# Patient Record
Sex: FEMALE | Race: WHITE | NOT HISPANIC OR LATINO | URBAN - METROPOLITAN AREA
[De-identification: names, ages, dates, MRNs, and addresses within clinical notes are randomized per-mention and may not be internally consistent; named-entity substitution may affect disease eponyms.]

---

## 2018-09-05 ENCOUNTER — EMERGENCY (EMERGENCY)
Facility: HOSPITAL | Age: 68
LOS: 1 days | Discharge: HOME | End: 2018-09-05
Attending: EMERGENCY MEDICINE
Payer: MEDICARE

## 2018-09-05 VITALS
DIASTOLIC BLOOD PRESSURE: 76 MMHG | OXYGEN SATURATION: 99 % | HEART RATE: 68 BPM | HEIGHT: 61 IN | TEMPERATURE: 98 F | SYSTOLIC BLOOD PRESSURE: 135 MMHG | RESPIRATION RATE: 17 BRPM | WEIGHT: 115.08 LBS

## 2018-09-05 VITALS
OXYGEN SATURATION: 99 % | RESPIRATION RATE: 16 BRPM | HEART RATE: 72 BPM | DIASTOLIC BLOOD PRESSURE: 64 MMHG | SYSTOLIC BLOOD PRESSURE: 117 MMHG

## 2018-09-05 LAB
ALBUMIN SERPL ELPH-MCNC: 4.3 G/DL — SIGNIFICANT CHANGE UP (ref 3.3–5)
ALP SERPL-CCNC: 87 U/L — SIGNIFICANT CHANGE UP (ref 40–120)
ALT FLD-CCNC: 26 U/L — SIGNIFICANT CHANGE UP (ref 10–45)
ANION GAP SERPL CALC-SCNC: 11 MMOL/L — SIGNIFICANT CHANGE UP (ref 5–17)
APPEARANCE UR: CLEAR — SIGNIFICANT CHANGE UP
AST SERPL-CCNC: 30 U/L — SIGNIFICANT CHANGE UP (ref 10–40)
BASOPHILS # BLD AUTO: 0 K/UL — SIGNIFICANT CHANGE UP (ref 0–0.2)
BASOPHILS NFR BLD AUTO: 0.2 % — SIGNIFICANT CHANGE UP (ref 0–2)
BILIRUB SERPL-MCNC: 0.4 MG/DL — SIGNIFICANT CHANGE UP (ref 0.2–1.2)
BILIRUB UR-MCNC: NEGATIVE — SIGNIFICANT CHANGE UP
BUN SERPL-MCNC: 17 MG/DL — SIGNIFICANT CHANGE UP (ref 7–23)
CALCIUM SERPL-MCNC: 10.2 MG/DL — SIGNIFICANT CHANGE UP (ref 8.4–10.5)
CHLORIDE SERPL-SCNC: 97 MMOL/L — SIGNIFICANT CHANGE UP (ref 96–108)
CO2 SERPL-SCNC: 28 MMOL/L — SIGNIFICANT CHANGE UP (ref 22–31)
COLOR SPEC: YELLOW — SIGNIFICANT CHANGE UP
CREAT SERPL-MCNC: 1.01 MG/DL — SIGNIFICANT CHANGE UP (ref 0.5–1.3)
DIFF PNL FLD: NEGATIVE — SIGNIFICANT CHANGE UP
EOSINOPHIL # BLD AUTO: 0 K/UL — SIGNIFICANT CHANGE UP (ref 0–0.5)
EOSINOPHIL NFR BLD AUTO: 0.5 % — SIGNIFICANT CHANGE UP (ref 0–6)
EPI CELLS # UR: SIGNIFICANT CHANGE UP /HPF
GAS PNL BLDV: SIGNIFICANT CHANGE UP
GLUCOSE SERPL-MCNC: 134 MG/DL — HIGH (ref 70–99)
GLUCOSE UR QL: NEGATIVE — SIGNIFICANT CHANGE UP
HCT VFR BLD CALC: 42.4 % — SIGNIFICANT CHANGE UP (ref 34.5–45)
HGB BLD-MCNC: 14 G/DL — SIGNIFICANT CHANGE UP (ref 11.5–15.5)
KETONES UR-MCNC: ABNORMAL
LEUKOCYTE ESTERASE UR-ACNC: ABNORMAL
LIDOCAIN IGE QN: 36 U/L — SIGNIFICANT CHANGE UP (ref 7–60)
LYMPHOCYTES # BLD AUTO: 0.6 K/UL — LOW (ref 1–3.3)
LYMPHOCYTES # BLD AUTO: 5.9 % — LOW (ref 13–44)
MCHC RBC-ENTMCNC: 31 PG — SIGNIFICANT CHANGE UP (ref 27–34)
MCHC RBC-ENTMCNC: 33 GM/DL — SIGNIFICANT CHANGE UP (ref 32–36)
MCV RBC AUTO: 93.8 FL — SIGNIFICANT CHANGE UP (ref 80–100)
MONOCYTES # BLD AUTO: 0.2 K/UL — SIGNIFICANT CHANGE UP (ref 0–0.9)
MONOCYTES NFR BLD AUTO: 1.6 % — LOW (ref 2–14)
NEUTROPHILS # BLD AUTO: 8.6 K/UL — HIGH (ref 1.8–7.4)
NEUTROPHILS NFR BLD AUTO: 91.7 % — HIGH (ref 43–77)
NITRITE UR-MCNC: NEGATIVE — SIGNIFICANT CHANGE UP
PH UR: 6.5 — SIGNIFICANT CHANGE UP (ref 5–8)
PLATELET # BLD AUTO: 200 K/UL — SIGNIFICANT CHANGE UP (ref 150–400)
POTASSIUM SERPL-MCNC: 3.7 MMOL/L — SIGNIFICANT CHANGE UP (ref 3.5–5.3)
POTASSIUM SERPL-SCNC: 3.7 MMOL/L — SIGNIFICANT CHANGE UP (ref 3.5–5.3)
PROT SERPL-MCNC: 6.7 G/DL — SIGNIFICANT CHANGE UP (ref 6–8.3)
PROT UR-MCNC: SIGNIFICANT CHANGE UP
RBC # BLD: 4.52 M/UL — SIGNIFICANT CHANGE UP (ref 3.8–5.2)
RBC # FLD: 12.9 % — SIGNIFICANT CHANGE UP (ref 10.3–14.5)
RBC CASTS # UR COMP ASSIST: SIGNIFICANT CHANGE UP /HPF (ref 0–2)
SODIUM SERPL-SCNC: 136 MMOL/L — SIGNIFICANT CHANGE UP (ref 135–145)
SP GR SPEC: 1.02 — SIGNIFICANT CHANGE UP (ref 1.01–1.02)
TROPONIN T, HIGH SENSITIVITY RESULT: 7 NG/L — SIGNIFICANT CHANGE UP (ref 0–51)
TROPONIN T, HIGH SENSITIVITY RESULT: 7 NG/L — SIGNIFICANT CHANGE UP (ref 0–51)
UROBILINOGEN FLD QL: NEGATIVE — SIGNIFICANT CHANGE UP
WBC # BLD: 9.3 K/UL — SIGNIFICANT CHANGE UP (ref 3.8–10.5)
WBC # FLD AUTO: 9.3 K/UL — SIGNIFICANT CHANGE UP (ref 3.8–10.5)
WBC UR QL: SIGNIFICANT CHANGE UP /HPF (ref 0–5)

## 2018-09-05 PROCEDURE — 83690 ASSAY OF LIPASE: CPT

## 2018-09-05 PROCEDURE — 82803 BLOOD GASES ANY COMBINATION: CPT

## 2018-09-05 PROCEDURE — 93005 ELECTROCARDIOGRAM TRACING: CPT

## 2018-09-05 PROCEDURE — 99283 EMERGENCY DEPT VISIT LOW MDM: CPT

## 2018-09-05 PROCEDURE — 99284 EMERGENCY DEPT VISIT MOD MDM: CPT | Mod: 25

## 2018-09-05 PROCEDURE — 82947 ASSAY GLUCOSE BLOOD QUANT: CPT

## 2018-09-05 PROCEDURE — 74019 RADEX ABDOMEN 2 VIEWS: CPT | Mod: 26

## 2018-09-05 PROCEDURE — 82435 ASSAY OF BLOOD CHLORIDE: CPT

## 2018-09-05 PROCEDURE — 85014 HEMATOCRIT: CPT

## 2018-09-05 PROCEDURE — 82330 ASSAY OF CALCIUM: CPT

## 2018-09-05 PROCEDURE — 84295 ASSAY OF SERUM SODIUM: CPT

## 2018-09-05 PROCEDURE — 81001 URINALYSIS AUTO W/SCOPE: CPT

## 2018-09-05 PROCEDURE — 83605 ASSAY OF LACTIC ACID: CPT

## 2018-09-05 PROCEDURE — 80053 COMPREHEN METABOLIC PANEL: CPT

## 2018-09-05 PROCEDURE — 74019 RADEX ABDOMEN 2 VIEWS: CPT

## 2018-09-05 PROCEDURE — 85027 COMPLETE CBC AUTOMATED: CPT

## 2018-09-05 PROCEDURE — 84484 ASSAY OF TROPONIN QUANT: CPT

## 2018-09-05 PROCEDURE — 93010 ELECTROCARDIOGRAM REPORT: CPT

## 2018-09-05 PROCEDURE — 84132 ASSAY OF SERUM POTASSIUM: CPT

## 2018-09-05 RX ORDER — SODIUM CHLORIDE 9 MG/ML
1000 INJECTION INTRAMUSCULAR; INTRAVENOUS; SUBCUTANEOUS ONCE
Qty: 0 | Refills: 0 | Status: COMPLETED | OUTPATIENT
Start: 2018-09-05 | End: 2018-09-05

## 2018-09-05 RX ORDER — ONDANSETRON 8 MG/1
4 TABLET, FILM COATED ORAL ONCE
Qty: 0 | Refills: 0 | Status: COMPLETED | OUTPATIENT
Start: 2018-09-05 | End: 2018-09-05

## 2018-09-05 RX ADMIN — SODIUM CHLORIDE 1000 MILLILITER(S): 9 INJECTION INTRAMUSCULAR; INTRAVENOUS; SUBCUTANEOUS at 20:52

## 2018-09-05 NOTE — ED ADULT NURSE NOTE - NSIMPLEMENTINTERV_GEN_ALL_ED
Implemented All Universal Safety Interventions:  [place] to call system. Call bell, personal items and telephone within reach. Instruct patient to call for assistance. Room bathroom lighting operational. Non-slip footwear when patient is off stretcher. Physically safe environment: no spills, clutter or unnecessary equipment. Stretcher in lowest position, wheels locked, appropriate side rails in place.

## 2018-09-05 NOTE — ED PROVIDER NOTE - ATTENDING CONTRIBUTION TO CARE
Attending MD Gallegos:   I personally have seen and examined this patient.  Physician assistant note reviewed and agree on plan of care and except where noted.  See below for details.     67F with extensive PMH/PSH including GERD, MV repair, cervical cancer s/p hysterectomy, umbilical hernia repair, spinal fusion for scoliosis T4 to sacrum presents to the ED with abdominal pain and nausea.  Reports that developed periumbilical pain while driving, worsened at around 3pm after po intake.  Reports deep stabbing pain for three hours, no previous episode, decided to come in.  Reports now feeling improved, pain and nausea almost resolved.  Reports has frequent BMs at baseline and had about 4-5 with this pain, well formed, but increased in frequency.  Denies blood.  Reports nausea, no emesis.  Denies chest pain, shortness of breath, palpitations. Denies dysuria, hematuria, change in urinary habits including frequency, urgency. Denies fevers, reports chills while having severe pain, then resolved.  Denies syncope.  On exam, NAD, head NCAT, FROM at neck, no tenderness to palpation or stepoffs along length of spine, well healed surgical scar along spine, lungs CTAB with good inspiratory effort, +S1S2, +murmur, no r/g, well healed surgical scar in R upper chest, abdomen soft with +BS, minimal if any tenderness about 2cm cephalad and L lateral to umbilicus, ND, no CVAT, well healed surgical scars, no rebound, no guarding, moving all extremities with 5/5 strength bilateral upper and lower extremities, good and equal  strength bilaterally ;A/P: 67F with resolving nausea and abdominal pain, Ddx includes GI in etiology, doubt obstruction but given previous surgeries will obtain XR abdomen, doubt urine given denial of symptoms but will send UA, ddx includes cardiac in etiology, will obtain EKG and enzymes, will obtain labs.  Discussion with patient that given almost complete resolution of symptoms and benign abdominal exam, will obtain XR abdomen initially and look at labs, if non actionable, will defer further imaging at this time.  Patient amenable.

## 2018-09-05 NOTE — ED PROVIDER NOTE - PROGRESS NOTE DETAILS
Attending MD Gallegos: Received call from Dr. Solorzano [phone].  Updated him on case.  Awaiting XRs. Attending MD Gallegos: Patient re-evaluated and feeling improved.  No acute issues at  this time.  Lab and radiology tests reviewed with patient and family and Dr. Solorzano.  Discussed mod LE with Dr. Solorzano, will repeat UA in office tomorow.  Patient stable for discharge. Follow up instructions given, importance of follow up emphasized, return to ED parameters reviewed and patient verbalized understanding.  All questions answered, all concerns addressed. Attending MD Gallegos: Patient re-evaluated and feeling improved.  No acute issues at  this time.  Lab and radiology tests reviewed with patient and family and Dr. Solorzano.  Discussed mod LE with Dr. Solorzano, will repeat UA in office tomorrow.  Does not wish to stay for repeat troponin, had wanted to leave before, agreeable to draw now and call to [phone] with results.  Patient stable for discharge. Follow up instructions given, importance of follow up emphasized, return to ED parameters reviewed and patient verbalized understanding.  All questions answered, all concerns addressed. Attending MD Gallegos: Called patient, gave result of troponin, verbalized understanding.

## 2018-09-05 NOTE — ED ADULT NURSE NOTE - OBJECTIVE STATEMENT
Patient presented to ED ambulatory w/ c/o left side of umbilicus abdominal pain for 3/4 hours. Patient is only there and came very sharp initially, presently is 2/10 , the pain came accompanied w/ Nausea, no vomiting. No tenderness w/ palpation, no rebound tenderness noted. Patient stating pain is much less now.

## 2018-09-05 NOTE — ED PROVIDER NOTE - OBJECTIVE STATEMENT
66yo F with pmhx of GERD past surg history of mitral valve repair, spinal fusion from t4-sacrum, hysterectomy 2004 due to cervical cancer. umbilical hernia repair 2013 presents to ER c/o of non-radiating sharp periumbilical pain that started @1pm today while she was driving reports at that time pain was mild, went home and at lunch and pain increased significantly associated with nausea, reports had about 5 non-bloody BM, however usually has ~3-5BM daily.  Reports @630pm pain eased up greatly.  Denies fevers, chills, diarrhea, dysuria, vomiting, recent travel, chest pain, SOB,  recent ABX use, recent travel, back pain. bloody stool

## 2022-05-17 ENCOUNTER — APPOINTMENT (OUTPATIENT)
Dept: ORTHOPEDIC SURGERY | Facility: CLINIC | Age: 72
End: 2022-05-17

## 2022-06-10 ENCOUNTER — APPOINTMENT (OUTPATIENT)
Dept: ORTHOPEDIC SURGERY | Facility: CLINIC | Age: 72
End: 2022-06-10
Payer: MEDICARE

## 2022-06-10 VITALS — BODY MASS INDEX: 21.53 KG/M2 | HEIGHT: 62 IN | WEIGHT: 117 LBS

## 2022-06-10 DIAGNOSIS — Z78.9 OTHER SPECIFIED HEALTH STATUS: ICD-10-CM

## 2022-06-10 PROCEDURE — 99213 OFFICE O/P EST LOW 20 MIN: CPT

## 2022-09-30 ENCOUNTER — APPOINTMENT (OUTPATIENT)
Dept: ORTHOPEDIC SURGERY | Facility: CLINIC | Age: 72
End: 2022-09-30

## 2022-09-30 VITALS — HEIGHT: 62 IN | WEIGHT: 117 LBS | BODY MASS INDEX: 21.53 KG/M2

## 2022-09-30 DIAGNOSIS — M79.671 PAIN IN RIGHT FOOT: ICD-10-CM

## 2022-09-30 DIAGNOSIS — M79.672 PAIN IN RIGHT FOOT: ICD-10-CM

## 2022-09-30 PROCEDURE — 99213 OFFICE O/P EST LOW 20 MIN: CPT

## 2022-09-30 NOTE — DATA REVIEWED
[MRI] : MRI [Ankle] : ankle [I reviewed the films/CD] : I reviewed the films/CD [FreeTextEntry1] : [facility] 9/15/20:  MRI R ankle:  Small plantar fibroma along the medial band of the plantar fascia, in the region of the soft tissue markers. Mild degenerative thickening of the plantar fascia at the calcaneal attachment, without tear or perifascial edema. [FreeTextEntry2] : [facility] 9/15/20:  MRI L ankle: IMPRESSION:\par 1. Mild degenerative thickening of the plantar fascia at the calcaneal attachment, associated with a \par small plantar calcaneal spur. No plantar fascial tear is seen.\par 2. Evidence of remote anterior talofibular ligament injury.

## 2022-09-30 NOTE — HISTORY OF PRESENT ILLNESS
[Left Leg] : left leg [Right Leg] : right leg [Gradual] : gradual [7] : 7 [6] : 6 [Dull/Aching] : dull/aching [Localized] : localized [Intermittent] : intermittent [Physical therapy] : physical therapy [Standing] : standing [Walking] : walking [de-identified] :  9/4/20: bilateral heel pain ongoing x 3 weeks. feels that the heels are worse with standing and without shoes. has lump to the plantar aspect of the right foot for about a year growing in size and getting painful. saw podiatry 2 weeks ago where she was given topical diclofenac/verapamil without relief. no n/t. \par 10/6/20 f/u bilat heel, am pain \par 11/20/20: fu bilateral heels. much better with PT. Transitioned to HEP. No AM pain. 80% improved \par 6/10/22: here for b/l heel pain. Started PT and night splints with some relieve.\par 9/30/22 f/u b/l heel pain ongoing since March. Increased since end of summer. She cannot tolerate prolonged standing, she has increased functional limitations or remain active. She saw her DPM, xrays were done  9/23/22 -> plantar spurs. PT/HEP, stretching on reformer, using ice without relief. She has resumed night splint. Shoe inserts without relief.  R is worse. [] : no [FreeTextEntry1] : feet [FreeTextEntry5] : no injury  [de-identified] : Dr Salazar  [de-identified] : physical therapy

## 2022-09-30 NOTE — DISCUSSION/SUMMARY
[de-identified] : She continues to have pain and functional limitations.\par PRP discussed. \par PT 2-3 x a week, HEP. Night splint ice, hep.\par Discussed precaution with csi.\par She is considering PRP as reviewed.\par Updated MRI B/L ankles indicated to evaluate new tear plantar fascia/pathology

## 2022-10-02 ENCOUNTER — FORM ENCOUNTER (OUTPATIENT)
Age: 72
End: 2022-10-02

## 2022-10-03 ENCOUNTER — APPOINTMENT (OUTPATIENT)
Dept: MRI IMAGING | Facility: CLINIC | Age: 72
End: 2022-10-03

## 2022-10-03 PROCEDURE — 73721 MRI JNT OF LWR EXTRE W/O DYE: CPT | Mod: LT,MH

## 2022-10-03 PROCEDURE — 73721 MRI JNT OF LWR EXTRE W/O DYE: CPT | Mod: RT,MH

## 2022-10-14 ENCOUNTER — APPOINTMENT (OUTPATIENT)
Dept: ORTHOPEDIC SURGERY | Facility: CLINIC | Age: 72
End: 2022-10-14

## 2022-10-14 VITALS — BODY MASS INDEX: 21.53 KG/M2 | WEIGHT: 117 LBS | HEIGHT: 62 IN

## 2022-10-14 PROCEDURE — 99214 OFFICE O/P EST MOD 30 MIN: CPT

## 2022-10-14 NOTE — DATA REVIEWED
[Right] : of the right [Ankle] : ankle [I independently reviewed and interpreted images and report] : I independently reviewed and interpreted images and report [MRI] : MRI [Report was reviewed and noted in the chart] : The report was reviewed and noted in the chart [I reviewed the films/CD and agree] : I reviewed the films/CD and agree [FreeTextEntry1] : 10/3/22: 1. Plantar fascial thickening, degeneration at origin with 2 mm spur. This is stable from the prior study. No \par fibroma.\par 2. Achilles tendinopathy with peritendinous edema stable from the prior study.\par 3. Posterior tibial tendinopathy with tenosynovitis, decreased tenosynovitis from the prior study.\par 4. Scarring of tarsal sinus ligament and fat with 12 mm ganglion at dorsal margin of the sinus projecting over \par the lateral talonavicular joint. 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 [FreeTextEntry2] : 10/3/22 1. 11 x 9 mm plantar fibroma 5.1 cm distal to the origin, decreased in size from the prior study.\par 2. Posterior tibial tendinopathy with tenosynovitis, increased from the prior study. 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 Remainder of the exam is not significantly changed. [FreeTextEntry3] : Impression:  1. Plantar fascial thickening, degeneration at origin with 2 mm spur. This is stable from the prior study. No  fibroma. 2. Achilles tendinopathy with peritendinous edema stable from the prior study. 3. Posterior tibial tendinopathy with tenosynovitis, decreased tenosynovitis from the prior study. 4. Scarring of tarsal sinus ligament and fat with 12 mm ganglion at dorsal margin of the sinus projecting over  the lateral talonavicular joint. 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

## 2022-10-14 NOTE — HISTORY OF PRESENT ILLNESS
[Left Leg] : left leg [Right Leg] : right leg [Gradual] : gradual [5] : 5 [1] : 2 [Dull/Aching] : dull/aching [Localized] : localized [Intermittent] : intermittent [Physical therapy] : physical therapy [Standing] : standing [Walking] : walking [de-identified] :  9/4/20: bilateral heel pain ongoing x 3 weeks. feels that the heels are worse with standing and without shoes. has lump to the plantar aspect of the right foot for about a year growing in size and getting painful. saw podiatry 2 weeks ago where she was given topical diclofenac/verapamil without relief. no n/t. \par 10/6/20 f/u bilat heel, am pain \par 11/20/20: fu bilateral heels. much better with PT. Transitioned to HEP. No AM pain. 80% improved \par 6/10/22: here for b/l heel pain. Started PT and night splints with some relief..  [] : no [FreeTextEntry1] : feet [FreeTextEntry5] : no injury  [de-identified] : Dr Salazar  [de-identified] : physical therapy

## 2022-10-14 NOTE — HISTORY OF PRESENT ILLNESS
[Left Leg] : left leg [Right Leg] : right leg [Gradual] : gradual [6] : 6 [5] : 5 [Dull/Aching] : dull/aching [Localized] : localized [Intermittent] : intermittent [Physical therapy] : physical therapy [Standing] : standing [Walking] : walking [Retired] : Work status: retired [de-identified] :  9/4/20: bilateral heel pain ongoing x 3 weeks. feels that the heels are worse with standing and without shoes. has lump to the plantar aspect of the right foot for about a year growing in size and getting painful. saw podiatry 2 weeks ago where she was given topical diclofenac/verapamil without relief. no n/t. \par 10/6/20 f/u bilat heel, am pain \par 11/20/20: fu bilateral heels. much better with PT. Transitioned to HEP. No AM pain. 80% improved \par 6/10/22: here for b/l heel pain. Started PT and night splints with some relieve.\par 9/30/22 f/u b/l heel pain ongoing since March. Increased since end of summer. She cannot tolerate prolonged standing, she has increased functional limitations or remain active. She saw her DPM, xrays were done  9/23/22 -> plantar spurs. PT/HEP, stretching on reformer, using ice without relief. She has resumed night splint. Shoe inserts without relief.  R is worse.\par 10/14/22: f/u B/L feet to review the MRI's. 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

## 2022-11-11 ENCOUNTER — APPOINTMENT (OUTPATIENT)
Dept: ORTHOPEDIC SURGERY | Facility: CLINIC | Age: 72
End: 2022-11-11

## 2022-11-11 PROCEDURE — 99213 OFFICE O/P EST LOW 20 MIN: CPT

## 2022-11-11 NOTE — HISTORY OF PRESENT ILLNESS
[Left Leg] : left leg [Right Leg] : right leg [Gradual] : gradual [5] : 5 [4] : 4 [Dull/Aching] : dull/aching [Localized] : localized [Intermittent] : intermittent [Physical therapy] : physical therapy [Standing] : standing [Walking] : walking [Retired] : Work status: retired [de-identified] :  9/4/20: bilateral heel pain ongoing x 3 weeks. feels that the heels are worse with standing and without shoes. has lump to the plantar aspect of the right foot for about a year growing in size and getting painful. saw podiatry 2 weeks ago where she was given topical diclofenac/verapamil without relief. no n/t. \par 10/6/20 f/u bilat heel, am pain \par 11/20/20: fu bilateral heels. much better with PT. Transitioned to HEP. No AM pain. 80% improved \par 6/10/22: here for b/l heel pain. Started PT and night splints with some relieve.\par 9/30/22 f/u b/l heel pain ongoing since March. Increased since end of summer. She cannot tolerate prolonged standing, she has increased functional limitations or remain active. She saw her DPM, xrays were done  9/23/22 -> plantar spurs. PT/HEP, stretching on reformer, using ice without relief. She has resumed night splint. Shoe inserts without relief.  R is worse.\par 10/14/22: f/u B/L feet to review the MRI's.\par 11/11/22 here for a follow up bl feet. She had about 8 sessions of PT with improvement. Had some increased pain to the neck and the back due to poor form in PT.  67% Improved.  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

## 2023-03-17 ENCOUNTER — APPOINTMENT (OUTPATIENT)
Dept: ORTHOPEDIC SURGERY | Facility: CLINIC | Age: 73
End: 2023-03-17
Payer: MEDICARE

## 2023-03-17 VITALS — WEIGHT: 117 LBS | HEIGHT: 62 IN | BODY MASS INDEX: 21.53 KG/M2

## 2023-03-17 DIAGNOSIS — D21.21 BENIGN NEOPLASM OF CONNECTIVE AND OTHER SOFT TISSUE OF RIGHT LOWER LIMB, INCLUDING HIP: ICD-10-CM

## 2023-03-17 PROCEDURE — L4397: CPT | Mod: KX,RT

## 2023-03-17 NOTE — DISCUSSION/SUMMARY
[de-identified] : continue HEP\par PRP scheduled for right plantar fascia \par night splint \par f/u 6 weeks

## 2023-03-17 NOTE — HISTORY OF PRESENT ILLNESS
[Left Leg] : left leg [Right Leg] : right leg [Gradual] : gradual [2] : 2 [1] : 2 [Dull/Aching] : dull/aching [Localized] : localized [Intermittent] : intermittent [Physical therapy] : physical therapy [Standing] : standing [Walking] : walking [Retired] : Work status: retired [de-identified] :  9/4/20: bilateral heel pain ongoing x 3 weeks. feels that the heels are worse with standing and without shoes. has lump to the plantar aspect of the right foot for about a year growing in size and getting painful. saw podiatry 2 weeks ago where she was given topical diclofenac/verapamil without relief. no n/t. \par 10/6/20 f/u bilat heel, am pain \par 11/20/20: fu bilateral heels. much better with PT. Transitioned to HEP. No AM pain. 80% improved \par 6/10/22: here for b/l heel pain. Started PT and night splints with some relieve.\par 9/30/22 f/u b/l heel pain ongoing since March. Increased since end of summer. She cannot tolerate prolonged standing, she has increased functional limitations or remain active. She saw her DPM, xrays were done  9/23/22 -> plantar spurs. PT/HEP, stretching on reformer, using ice without relief. She has resumed night splint. Shoe inserts without relief.  R is worse.\par 10/14/22: f/u B/L feet to review the MRI's.\par 11/11/22 here for a follow up bl feet. She had about 8 sessions of PT with improvement. Had some increased pain to the neck and the back due to poor form in PT.  67% Improved. \par 3/17/23: f/u B/L feet right>left. Throbbing pain to the heel persists.  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

## 2023-04-25 ENCOUNTER — APPOINTMENT (OUTPATIENT)
Dept: ORTHOPEDIC SURGERY | Facility: CLINIC | Age: 73
End: 2023-04-25
Payer: MEDICARE

## 2023-04-25 VITALS — WEIGHT: 117 LBS | HEIGHT: 62 IN | BODY MASS INDEX: 21.53 KG/M2

## 2023-04-25 PROCEDURE — 005KIT: CUSTOM

## 2023-04-25 PROCEDURE — L4361: CPT | Mod: KX,LT

## 2023-04-25 PROCEDURE — 99024 POSTOP FOLLOW-UP VISIT: CPT

## 2023-04-25 NOTE — HISTORY OF PRESENT ILLNESS
[Left Leg] : left leg [Right Leg] : right leg [Gradual] : gradual [2] : 2 [1] : 2 [Dull/Aching] : dull/aching [Localized] : localized [Intermittent] : intermittent [Physical therapy] : physical therapy [Standing] : standing [Walking] : walking [Retired] : Work status: retired [de-identified] :  9/4/20: bilateral heel pain ongoing x 3 weeks. feels that the heels are worse with standing and without shoes. has lump to the plantar aspect of the right foot for about a year growing in size and getting painful. saw podiatry 2 weeks ago where she was given topical diclofenac/verapamil without relief. no n/t. \par 10/6/20 f/u bilat heel, am pain \par 11/20/20: fu bilateral heels. much better with PT. Transitioned to HEP. No AM pain. 80% improved \par 6/10/22: here for b/l heel pain. Started PT and night splints with some relieve.\par 9/30/22 f/u b/l heel pain ongoing since March. Increased since end of summer. She cannot tolerate prolonged standing, she has increased functional limitations or remain active. She saw her DPM, xrays were done  9/23/22 -> plantar spurs. PT/HEP, stretching on reformer, using ice without relief. She has resumed night splint. Shoe inserts without relief.  R is worse.\par 10/14/22: f/u B/L feet to review the MRI's.\par 11/11/22 here for a follow up bl feet. She had about 8 sessions of PT with improvement. Had some increased pain to the neck and the back due to poor form in PT.  67% Improved. \par 3/17/23: f/u B/L feet right>left. Throbbing pain to the heel persists. 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

## 2023-04-25 NOTE — DISCUSSION/SUMMARY
[de-identified] : PRP R plantar fascia tolerated well\par 5cc lido/5cc marcaine peripheral block tolerated well\par no icing/nsaids x 2 weeks\par WBAT in cam boot\par f/u 2 weeks

## 2023-04-28 ENCOUNTER — APPOINTMENT (OUTPATIENT)
Dept: ORTHOPEDIC SURGERY | Facility: CLINIC | Age: 73
End: 2023-04-28

## 2023-05-11 ENCOUNTER — APPOINTMENT (OUTPATIENT)
Dept: ORTHOPEDIC SURGERY | Facility: CLINIC | Age: 73
End: 2023-05-11
Payer: MEDICARE

## 2023-05-11 PROCEDURE — 99213 OFFICE O/P EST LOW 20 MIN: CPT

## 2023-05-11 NOTE — HISTORY OF PRESENT ILLNESS
[Left Leg] : left leg [Right Leg] : right leg [Gradual] : gradual [2] : 2 [1] : 2 [Dull/Aching] : dull/aching [Localized] : localized [Intermittent] : intermittent [Physical therapy] : physical therapy [Standing] : standing [Walking] : walking [Retired] : Work status: retired [de-identified] :  9/4/20: bilateral heel pain ongoing x 3 weeks. feels that the heels are worse with standing and without shoes. has lump to the plantar aspect of the right foot for about a year growing in size and getting painful. saw podiatry 2 weeks ago where she was given topical diclofenac/verapamil without relief. no n/t. \par 10/6/20 f/u bilat heel, am pain \par 11/20/20: fu bilateral heels. much better with PT. Transitioned to HEP. No AM pain. 80% improved \par 6/10/22: here for b/l heel pain. Started PT and night splints with some relieve.\par 9/30/22 f/u b/l heel pain ongoing since March. Increased since end of summer. She cannot tolerate prolonged standing, she has increased functional limitations or remain active. She saw her DPM, xrays were done  9/23/22 -> plantar spurs. PT/HEP, stretching on reformer, using ice without relief. She has resumed night splint. Shoe inserts without relief.  R is worse.\par 10/14/22: f/u B/L feet to review the MRI's.\par 11/11/22 here for a follow up bl feet. She had about 8 sessions of PT with improvement. Had some increased pain to the neck and the back due to poor form in PT.  67% Improved. \par 3/17/23: f/u B/L feet right>left. Throbbing pain to the heel persists. \par \par 04/25/23: PRP R plantar fascia \par \par 05/11/23: f/u R foot (plantar fascia) pain localized mid foot and heel. Pain slightly subsided.  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

## 2023-05-12 ENCOUNTER — APPOINTMENT (OUTPATIENT)
Dept: ORTHOPEDIC SURGERY | Facility: CLINIC | Age: 73
End: 2023-05-12

## 2023-06-06 ENCOUNTER — APPOINTMENT (OUTPATIENT)
Dept: ORTHOPEDIC SURGERY | Facility: CLINIC | Age: 73
End: 2023-06-06
Payer: SELF-PAY

## 2023-06-06 VITALS — WEIGHT: 117 LBS | HEIGHT: 62 IN | BODY MASS INDEX: 21.53 KG/M2

## 2023-06-06 PROCEDURE — 005KIT: CUSTOM

## 2023-06-06 PROCEDURE — 99024 POSTOP FOLLOW-UP VISIT: CPT

## 2023-06-06 NOTE — HISTORY OF PRESENT ILLNESS
[Left Leg] : left leg [Right Leg] : right leg [Gradual] : gradual [2] : 2 [1] : 2 [Dull/Aching] : dull/aching [Localized] : localized [Intermittent] : intermittent [Physical therapy] : physical therapy [Standing] : standing [Walking] : walking [Retired] : Work status: retired [de-identified] :  9/4/20: bilateral heel pain ongoing x 3 weeks. feels that the heels are worse with standing and without shoes. has lump to the plantar aspect of the right foot for about a year growing in size and getting painful. saw podiatry 2 weeks ago where she was given topical diclofenac/verapamil without relief. no n/t. \par 10/6/20 f/u bilat heel, am pain \par 11/20/20: fu bilateral heels. much better with PT. Transitioned to HEP. No AM pain. 80% improved \par 6/10/22: here for b/l heel pain. Started PT and night splints with some relieve.\par 9/30/22 f/u b/l heel pain ongoing since March. Increased since end of summer. She cannot tolerate prolonged standing, she has increased functional limitations or remain active. She saw her DPM, xrays were done  9/23/22 -> plantar spurs. PT/HEP, stretching on reformer, using ice without relief. She has resumed night splint. Shoe inserts without relief.  R is worse.\par 10/14/22: f/u B/L feet to review the MRI's.\par 11/11/22 here for a follow up bl feet. She had about 8 sessions of PT with improvement. Had some increased pain to the neck and the back due to poor form in PT.  67% Improved. \par 3/17/23: f/u B/L feet right>left. Throbbing pain to the heel persists. 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

## 2023-06-06 NOTE — DISCUSSION/SUMMARY
[de-identified] : PRP L plantar fascia tolerated well\par 5cc lido/5cc marcaine peripheral block tolerated well\par no icing/nsaids x 2 weeks\par WBAT in cam boot\par f/u 2 weeks.

## 2023-06-06 NOTE — PROCEDURE
[Tendon Origin] : tendon origin [Left] : of the left [Pain] : pain [Inflammation] : inflammation [Repeat series performed] : repeat series performed [Alcohol] : alcohol [Ethyl Chloride sprayed topically] : ethyl chloride sprayed topically [Sterile technique used] : sterile technique used [] : Patient tolerated procedure well [Call if redness, pain or fever occur] : call if redness, pain or fever occur [Patient was advised to rest the joint(s) for ____ days] : patient was advised to rest the joint(s) for [unfilled] days [Patient had decreased mobility in the joint] : patient had decreased mobility in the joint [Risks, benefits, alternatives discussed / Verbal consent obtained] : the risks benefits, and alternatives have been discussed, and verbal consent was obtained [All ultrasound images have been permanently captured and stored accordingly in our picture archiving and communication system] : All ultrasound images have been permanently captured and stored accordingly in our picture archiving and communication system [de-identified] : PRP L plantar fascia

## 2023-06-09 ENCOUNTER — APPOINTMENT (OUTPATIENT)
Dept: ORTHOPEDIC SURGERY | Facility: CLINIC | Age: 73
End: 2023-06-09

## 2023-06-13 ENCOUNTER — APPOINTMENT (OUTPATIENT)
Dept: ORTHOPEDIC SURGERY | Facility: CLINIC | Age: 73
End: 2023-06-13

## 2023-06-20 ENCOUNTER — APPOINTMENT (OUTPATIENT)
Dept: ORTHOPEDIC SURGERY | Facility: CLINIC | Age: 73
End: 2023-06-20
Payer: MEDICARE

## 2023-06-20 PROCEDURE — 99213 OFFICE O/P EST LOW 20 MIN: CPT

## 2023-06-20 RX ORDER — DICLOFENAC SODIUM 1% 10 MG/G
1 GEL TOPICAL
Qty: 1 | Refills: 2 | Status: ACTIVE | COMMUNITY
Start: 2023-06-20 | End: 1900-01-01

## 2023-06-20 NOTE — HISTORY OF PRESENT ILLNESS
[Left Leg] : left leg [Right Leg] : right leg [Gradual] : gradual [Dull/Aching] : dull/aching [Localized] : localized [Intermittent] : intermittent [Physical therapy] : physical therapy [Standing] : standing [Walking] : walking [Retired] : Work status: retired [1] : 2 [de-identified] :  9/4/20: bilateral heel pain ongoing x 3 weeks. feels that the heels are worse with standing and without shoes. has lump to the plantar aspect of the right foot for about a year growing in size and getting painful. saw podiatry 2 weeks ago where she was given topical diclofenac/verapamil without relief. no n/t. \par 10/6/20 f/u bilat heel, am pain \par 11/20/20: fu bilateral heels. much better with PT. Transitioned to HEP. No AM pain. 80% improved \par 6/10/22: here for b/l heel pain. Started PT and night splints with some relieve.\par 9/30/22 f/u b/l heel pain ongoing since March. Increased since end of summer. She cannot tolerate prolonged standing, she has increased functional limitations or remain active. She saw her DPM, xrays were done  9/23/22 -> plantar spurs. PT/HEP, stretching on reformer, using ice without relief. She has resumed night splint. Shoe inserts without relief.  R is worse.\par 10/14/22: f/u B/L feet to review the MRI's.\par 11/11/22 here for a follow up bl feet. She had about 8 sessions of PT with improvement. Had some increased pain to the neck and the back due to poor form in PT.  67% Improved. \par 3/17/23: f/u B/L feet right>left. Throbbing pain to the heel persists. \par \par 04/25/23: PRP R plantar fascia \par \par 05/11/23: PRP L plantar fascia \par \par 06/20/2023: Follow up on left foot. States pain has improved PT 2x a week but needs a new prescription for PT  R side 40% improved [] : Post Surgical Visit: no [FreeTextEntry1] : feet [FreeTextEntry3] : N/A Chronic [FreeTextEntry5] : 72 Y/O RHD F eval JING Ankles Chronic pain no associated trauma pt states slight improvement in condition since last visit.pt scheduled for R PRP in 4/23 [de-identified] : Dr Salazar  [de-identified] : PT 2x wkly  [de-identified] : Teacher

## 2023-06-23 ENCOUNTER — APPOINTMENT (OUTPATIENT)
Dept: ORTHOPEDIC SURGERY | Facility: CLINIC | Age: 73
End: 2023-06-23

## 2023-08-08 ENCOUNTER — APPOINTMENT (OUTPATIENT)
Dept: ORTHOPEDIC SURGERY | Facility: CLINIC | Age: 73
End: 2023-08-08
Payer: MEDICARE

## 2023-08-08 DIAGNOSIS — M72.2 PLANTAR FASCIAL FIBROMATOSIS: ICD-10-CM

## 2023-08-08 PROCEDURE — L4397: CPT | Mod: KX,RT

## 2023-08-08 PROCEDURE — 99213 OFFICE O/P EST LOW 20 MIN: CPT

## 2023-08-08 NOTE — DISCUSSION/SUMMARY
[de-identified] : Continue HEP Will consider CSI at the end of 9/2023 prior to her trip to [place]  night splint given  f/u 6 weeks

## 2023-08-08 NOTE — PHYSICAL EXAM
[Left] : left foot and ankle [Right] : right foot and ankle [NL (20)] : dorsiflexion 20 degrees [NL (40)] : plantar flexion 40 degrees [5___] : plantar flexion 5[unfilled]/5 [2+] : dorsalis pedis pulse: 2+ [] : patient ambulates without assistive device [FreeTextEntry3] : plantar pea sized prominence

## 2023-08-08 NOTE — HISTORY OF PRESENT ILLNESS
[Left Leg] : left leg [Right Leg] : right leg [Gradual] : gradual [1] : 2 [Dull/Aching] : dull/aching [Localized] : localized [Intermittent] : intermittent [Physical therapy] : physical therapy [Standing] : standing [Walking] : walking [Retired] : Work status: retired [de-identified] :  9/4/20: bilateral heel pain ongoing x 3 weeks. feels that the heels are worse with standing and without shoes. has lump to the plantar aspect of the right foot for about a year growing in size and getting painful. saw podiatry 2 weeks ago where she was given topical diclofenac/verapamil without relief. no n/t.  10/6/20 f/u bilat heel, am pain  11/20/20: fu bilateral heels. much better with PT. Transitioned to HEP. No AM pain. 80% improved  6/10/22: here for b/l heel pain. Started PT and night splints with some relieve. 9/30/22 f/u b/l heel pain ongoing since March. Increased since end of summer. She cannot tolerate prolonged standing, she has increased functional limitations or remain active. She saw her DPM, xrays were done  9/23/22 -> plantar spurs. PT/HEP, stretching on reformer, using ice without relief. She has resumed night splint. Shoe inserts without relief.  R is worse. 10/14/22: f/u B/L feet to review the MRI's. 11/11/22 here for a follow up bl feet. She had about 8 sessions of PT with improvement. Had some increased pain to the neck and the back due to poor form in PT.  67% Improved.  3/17/23: f/u B/L feet right>left. Throbbing pain to the heel persists.   04/25/23: PRP R plantar fascia   05/11/23: PRP L plantar fascia   06/20/2023: Follow up on left foot. States pain has improved PT 2x a week but needs a new prescription for PT R side 40% improved 08/08/2023: Follow up on b/l . States Left foot about 85-90% improved, right foot there is only 70% improvement. She is using the night splint, but has some difficulty tolerating it.  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

## 2023-09-14 ENCOUNTER — APPOINTMENT (OUTPATIENT)
Dept: CT IMAGING | Facility: CLINIC | Age: 73
End: 2023-09-14
Payer: MEDICARE

## 2023-09-14 ENCOUNTER — TRANSCRIPTION ENCOUNTER (OUTPATIENT)
Age: 73
End: 2023-09-14

## 2023-09-14 PROCEDURE — 70491 CT SOFT TISSUE NECK W/DYE: CPT | Mod: MH

## 2023-09-19 ENCOUNTER — APPOINTMENT (OUTPATIENT)
Dept: SURGERY | Facility: CLINIC | Age: 73
End: 2023-09-19
Payer: MEDICARE

## 2023-09-19 VITALS
HEART RATE: 69 BPM | BODY MASS INDEX: 21.71 KG/M2 | WEIGHT: 118 LBS | DIASTOLIC BLOOD PRESSURE: 81 MMHG | SYSTOLIC BLOOD PRESSURE: 134 MMHG | HEIGHT: 62 IN

## 2023-09-19 DIAGNOSIS — K11.5 SIALOLITHIASIS: ICD-10-CM

## 2023-09-19 DIAGNOSIS — R60.0 LOCALIZED EDEMA: ICD-10-CM

## 2023-09-19 DIAGNOSIS — Z78.9 OTHER SPECIFIED HEALTH STATUS: ICD-10-CM

## 2023-09-19 DIAGNOSIS — Z85.41 PERSONAL HISTORY OF MALIGNANT NEOPLASM OF CERVIX UTERI: ICD-10-CM

## 2023-09-19 PROCEDURE — 99204 OFFICE O/P NEW MOD 45 MIN: CPT

## 2023-09-19 RX ORDER — AMOXICILLIN AND CLAVULANATE POTASSIUM 875; 125 MG/1; MG/1
875-125 TABLET, COATED ORAL
Qty: 20 | Refills: 3 | Status: ACTIVE | COMMUNITY
Start: 2023-09-19 | End: 1900-01-01

## 2023-09-29 ENCOUNTER — APPOINTMENT (OUTPATIENT)
Dept: ORTHOPEDIC SURGERY | Facility: CLINIC | Age: 73
End: 2023-09-29

## 2023-10-26 ENCOUNTER — APPOINTMENT (OUTPATIENT)
Dept: OTOLARYNGOLOGY | Facility: CLINIC | Age: 73
End: 2023-10-26

## 2023-12-21 ENCOUNTER — APPOINTMENT (OUTPATIENT)
Dept: SURGERY | Facility: CLINIC | Age: 73
End: 2023-12-21

## 2024-01-30 ENCOUNTER — NON-APPOINTMENT (OUTPATIENT)
Age: 74
End: 2024-01-30

## 2024-04-25 ENCOUNTER — APPOINTMENT (OUTPATIENT)
Dept: ORTHOPEDIC SURGERY | Facility: CLINIC | Age: 74
End: 2024-04-25
Payer: MEDICARE

## 2024-04-25 VITALS
HEART RATE: 74 BPM | DIASTOLIC BLOOD PRESSURE: 80 MMHG | SYSTOLIC BLOOD PRESSURE: 121 MMHG | OXYGEN SATURATION: 99 % | BODY MASS INDEX: 21.71 KG/M2 | HEIGHT: 62 IN | WEIGHT: 118 LBS

## 2024-04-25 DIAGNOSIS — Z98.1 ARTHRODESIS STATUS: ICD-10-CM

## 2024-04-25 PROCEDURE — 99204 OFFICE O/P NEW MOD 45 MIN: CPT

## 2024-04-25 PROCEDURE — 72082 X-RAY EXAM ENTIRE SPI 2/3 VW: CPT

## 2024-04-29 PROBLEM — Z98.1 S/P SPINAL FUSION: Status: ACTIVE | Noted: 2024-04-22

## 2024-04-29 NOTE — PHYSICAL EXAM
[de-identified] : NVI.  +TTP about bilateral sacrew screw connectors. [de-identified] : Scoliosis xray 4/25/2024:  Instrumentation intact, no halos about the screws, increased thoracic kyphosis

## 2024-04-29 NOTE — HISTORY OF PRESENT ILLNESS
[de-identified] : Pt presents with complaints of low back pain.  Had removal of Antunez julianne in 2006 by Shayne Tolbert and Schwab.    Has felt a "brick" in her low back x 18 years.   Has pain in lower lumbar region (PSIS bilaterally) with fatigue occasionally.  Denies radicular pain.  She is unable to run due to back pain.  Has , swims, does Pilates.  Has taken otc pain meds prn.

## 2024-04-29 NOTE — DISCUSSION/SUMMARY
[de-identified] : A lengthy discussion was had with the patient regarding her condition.  Rx CT lumbar spine to assess fusion, instrumentation.  Can review via telehealth.  Discussed steroid injection into sacral screw connectors after imaging, possible SHANDRA. The patient's questions were sought and answered satisfactorily.   INunu NP am acting as a scribe for Dr. Frank Schwab.

## 2024-06-05 ENCOUNTER — APPOINTMENT (OUTPATIENT)
Dept: CT IMAGING | Facility: CLINIC | Age: 74
End: 2024-06-05

## 2024-06-24 ENCOUNTER — APPOINTMENT (OUTPATIENT)
Dept: CT IMAGING | Facility: CLINIC | Age: 74
End: 2024-06-24

## 2025-07-11 ENCOUNTER — NON-APPOINTMENT (OUTPATIENT)
Age: 75
End: 2025-07-11

## 2025-07-11 ENCOUNTER — APPOINTMENT (OUTPATIENT)
Facility: CLINIC | Age: 75
End: 2025-07-11
Payer: MEDICARE

## 2025-07-11 VITALS
WEIGHT: 118 LBS | HEART RATE: 78 BPM | TEMPERATURE: 97.8 F | HEIGHT: 62 IN | OXYGEN SATURATION: 99 % | DIASTOLIC BLOOD PRESSURE: 75 MMHG | RESPIRATION RATE: 18 BRPM | BODY MASS INDEX: 21.71 KG/M2 | SYSTOLIC BLOOD PRESSURE: 117 MMHG

## 2025-07-11 PROCEDURE — 99203 OFFICE O/P NEW LOW 30 MIN: CPT
